# Patient Record
Sex: MALE | Race: WHITE | NOT HISPANIC OR LATINO | Employment: STUDENT | URBAN - METROPOLITAN AREA
[De-identification: names, ages, dates, MRNs, and addresses within clinical notes are randomized per-mention and may not be internally consistent; named-entity substitution may affect disease eponyms.]

---

## 2022-01-10 ENCOUNTER — MEDICAL CORRESPONDENCE (OUTPATIENT)
Dept: HEALTH INFORMATION MANAGEMENT | Facility: CLINIC | Age: 22
End: 2022-01-10
Payer: COMMERCIAL

## 2022-01-10 ENCOUNTER — TRANSFERRED RECORDS (OUTPATIENT)
Dept: HEALTH INFORMATION MANAGEMENT | Facility: CLINIC | Age: 22
End: 2022-01-10
Payer: COMMERCIAL

## 2022-01-10 ENCOUNTER — TRANSCRIBE ORDERS (OUTPATIENT)
Dept: OTHER | Age: 22
End: 2022-01-10
Payer: COMMERCIAL

## 2022-01-10 DIAGNOSIS — M95.8 OTHER SPECIFIED ACQUIRED DEFORMITIES OF MUSCULOSKELETAL SYSTEM: Primary | ICD-10-CM

## 2022-01-10 DIAGNOSIS — T84.84XA: ICD-10-CM

## 2022-01-10 DIAGNOSIS — Z98.890 OTHER SPECIFIED POSTPROCEDURAL STATES: ICD-10-CM

## 2022-01-10 DIAGNOSIS — M23.40 LOOSE BODY OF KNEE, UNSPECIFIED LATERALITY: ICD-10-CM

## 2022-01-11 ENCOUNTER — TELEPHONE (OUTPATIENT)
Dept: OTHER | Age: 22
End: 2022-01-11
Payer: COMMERCIAL

## 2022-01-11 NOTE — TELEPHONE ENCOUNTER
What provider at [facility] would see for the pt's dx? It shows non surgical, but looks like it could be a hardware removal.    Please triage and schedule/advise as appropriate    Ortho

## 2022-01-18 NOTE — TELEPHONE ENCOUNTER
DIAGNOSIS: Osteochondral defect of condyle of femur/XR/Natasha Guzman MD @ [facility] [place]   APPOINTMENT DATE: 1.19.22   NOTES STATUS DETAILS   OFFICE NOTE from referring provider Sent to scan 1/18 1:34 PM    OFFICE NOTE from other specialist N/A    DISCHARGE SUMMARY from hospital N/A    DISCHARGE REPORT from the ER N/A    OPERATIVE REPORT N/A    EMG report N/A    MEDICATION LIST N/A    MRI N/A 6.23.15 L knee, [facility]   DEXA (osteoporosis/bone health) N/A    CT SCAN N/A    XRAYS (IMAGES & REPORTS) PACS 1.10.22 L knee, [facility]    Other L knee images in PACS       Action 1.18.22 11:54 AM SARATH   Action Taken Faxed request to [facility] for all records [phone] and [phone]      Called [facility] and requested all L knee images bu pushed

## 2022-01-19 ENCOUNTER — OFFICE VISIT (OUTPATIENT)
Dept: ORTHOPEDICS | Facility: CLINIC | Age: 22
End: 2022-01-19
Payer: COMMERCIAL

## 2022-01-19 ENCOUNTER — PRE VISIT (OUTPATIENT)
Dept: ORTHOPEDICS | Facility: CLINIC | Age: 22
End: 2022-01-19

## 2022-01-19 ENCOUNTER — ANCILLARY PROCEDURE (OUTPATIENT)
Dept: GENERAL RADIOLOGY | Facility: CLINIC | Age: 22
End: 2022-01-19
Attending: ORTHOPAEDIC SURGERY
Payer: COMMERCIAL

## 2022-01-19 VITALS — WEIGHT: 220 LBS | BODY MASS INDEX: 29.8 KG/M2 | HEIGHT: 72 IN

## 2022-01-19 DIAGNOSIS — M95.8 OTHER SPECIFIED ACQUIRED DEFORMITIES OF MUSCULOSKELETAL SYSTEM: ICD-10-CM

## 2022-01-19 DIAGNOSIS — M23.40 LOOSE BODY OF KNEE, UNSPECIFIED LATERALITY: ICD-10-CM

## 2022-01-19 DIAGNOSIS — T84.84XA: ICD-10-CM

## 2022-01-19 DIAGNOSIS — Z98.890 OTHER SPECIFIED POSTPROCEDURAL STATES: ICD-10-CM

## 2022-01-19 PROCEDURE — 77073 BONE LENGTH STUDIES: CPT | Performed by: STUDENT IN AN ORGANIZED HEALTH CARE EDUCATION/TRAINING PROGRAM

## 2022-01-19 PROCEDURE — 99204 OFFICE O/P NEW MOD 45 MIN: CPT | Mod: GC | Performed by: ORTHOPAEDIC SURGERY

## 2022-01-19 ASSESSMENT — MIFFLIN-ST. JEOR: SCORE: 2040.91

## 2022-01-19 NOTE — LETTER
1/19/2022         RE: Fernando Joe  [address] [place] [id number]        Dear Colleague,    Thank you for referring your patient, Fernando Joe, to the [facility] [place]. Please see a copy of my visit note below.    CHIEF CONCERN: Left knee pain and mechanical symptoms, right knee mechanical symptoms    HISTORY:   Patient is a 21-year-old male who presents today for evaluation of his left and right knees.  He has a known history of bilateral lateral femoral condyle OCD lesions.  He underwent surgery in [facility] the [facility] in 2015 for his left knee and had what he describes was a cadaver allograft fixation procedure (no op note available today to review).  He reports that since then, he did quite well but started having some mechanical symptoms in his left knee 2 to 3 years ago.  These symptoms have been persistent since then occurring multiple times a week with range of motion activities and weightlifting.  He also has had some right knee mechanical symptoms that started about 2 years ago although are less frequent.  He denies any significant right knee pain.  His left knee does hurt him at approximately a 2/10 constant pain that he just deals with.  He is a student in chiropractic school and incidentally obtained knee x-rays which showed some ossific bodies in his knee which, in addition to his baseline symptoms over the past 2 to 3 years, prompted him to seek our evaluation today.    PAST MEDICAL HISTORY: (Reviewed with the patient and in the [facility] medical record)  1. None    PAST SURGICAL HISTORY: (Reviewed with the patient and in the [facility] medical record)  1. 2015-left knee OCD osteochondral allograft fixation    MEDICATIONS: (Reviewed with the patient and in the [facility] medical record)    Notable medications include: None    ALLERGIES: (Reviewed with the patient and in the [facility] medical record)  1. 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 Articulate and Interactive    Body mass index is 29.84 kg/m .    Right lower extremity :    Skin is Warm and Well perfused, no suggestion of infection    No appreciable effusion    Range of motion 0- 140    No tenderness to palpation over the medial or lateral patellofemoral joint lines or tibiofemoral joint lines.  No tenderness to firm palpation over the lateral femoral condyle    Stable to varus/valgus, Ia Lachman    EHL/FHL/TA/GS 5/5    Sensation intact L3-S1    2+ Dorsalis Pedis Pulse    Left lower extremity :    Skin is Warm and Well perfused, no suggestion of infection    Mild knee effusion    Range of motion 5- 135    No tenderness to palpation over the medial or lateral patellofemoral joint lines or the tibiofemoral joint lines.  No tenderness to palpation over the medial or lateral femoral condyles    Stable to varus and valgus, Ia Lachman    EHL/FHL/TA/GS 5/5    Sensation intact L3-S1    2+ Dorsalis Pedis Pulse    IMAGING:    Plain Radiographs: X-rays of the right and left knee obtained 1/10/2022 were reviewed and compared to prior x-rays from 2017 in 2015.  Left knee demonstrates a large lateral femoral condyle OCD lesion with fragment fixed with a K wire that has fractured at the interface between the allograft and the patient's native femur.  The K wire does not appear to be in the joint, though cannot definitively conclude from the views.  No other  fractures are seen.  There appears to be an ossific loose body in the suprapatellar pouch.    The right knee demonstrates a large lateral femoral condyle OCD lesion, no evidence of hardware or other fractures.  No significant degenerative joint disease in the medial compartment of the patellofemoral compartment.    MRI: No new MRI available for review.    ASSESSMENT:  1. Large left lateral femoral condyle OCD lesion, status post surgery in 2015 at outside institution  2. Large right lateral femoral condyle OCD lesion  3. Right and left knee mechanical symptoms (left more frequent than right)    PLAN:  1. Made a long discussion with the patient and reviewed the imaging that we have available from this past week and from prior years.  Given his diagnosis of bilateral OCD lesions and his clear mechanical symptoms, we discussed that we would be very likely to offer him surgery but would like to have an MRI of both knees to better evaluate the structures of the knee and the size of the OCD lesions which could help guide our surgical management.  We feel at this time we would likely opt for the patient arthroscopic surgery and loose body removal, and that the MRI and the intraoperative findings could help guide our further recommendations and coordination with the patient's symptoms moving forward.  2. Right and left knee MRIs  3. Standing long-leg alignment films  4. Follow-up in person after the MRI has been obtained and reviewed    The patient was seen and discussed with Dr. Nolan.    Nigel Hernández MD  Orthopedic Surgery PGY-5          Patient seen and examined with the Resident.     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  Osteochondritis dissecans lateral femoral condyle right knee    Plan standing alignment films, MRI right knee, MRI left knee, likely consider staged cartilage reconstruction with first arthroscopic chondroplasty  debridement loose body removal and subsequent osteochondral allograft transplantation if failed to see lasting improvement.  We will use the MRI to help determine.      I agree with history, physical and imaging as well as the assessment and plan as detailed by Dr. Hernández.         Again, thank you for allowing me to participate in the care of your patient.        Sincerely,        Esteban Nolan MD

## 2022-01-19 NOTE — PROGRESS NOTES
Patient seen and examined with the Resident.     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  Osteochondritis dissecans lateral femoral condyle right knee    Plan standing alignment films, MRI right knee, MRI left knee, likely consider staged cartilage reconstruction with first arthroscopic chondroplasty debridement loose body removal and subsequent osteochondral allograft transplantation if failed to see lasting improvement.  We will use the MRI to help determine.      I agree with history, physical and imaging as well as the assessment and plan as detailed by Dr. Hernández.

## 2022-01-19 NOTE — PROGRESS NOTES
CHIEF CONCERN: Left knee pain and mechanical symptoms, right knee mechanical symptoms    HISTORY:   Patient is a 21-year-old male who presents today for evaluation of his left and right knees.  He has a known history of bilateral lateral femoral condyle OCD lesions.  He underwent surgery in [facility] in 2015 for his left knee and had what he describes was a cadaver allograft fixation procedure (no op note available today to review).  He reports that since then, he did quite well but started having some mechanical symptoms in his left knee 2 to 3 years ago.  These symptoms have been persistent since then occurring multiple times a week with range of motion activities and weightlifting.  He also has had some right knee mechanical symptoms that started about 2 years ago although are less frequent.  He denies any significant right knee pain.  His left knee does hurt him at approximately a 2/10 constant pain that he just deals with.  He is a student in chiropractic school and incidentally obtained knee x-rays which showed some ossific bodies in his knee which, in addition to his baseline symptoms over the past 2 to 3 years, prompted him to seek our evaluation today.    PAST MEDICAL HISTORY: (Reviewed with the patient and in the [facility] medical record)  1. None    PAST SURGICAL HISTORY: (Reviewed with the patient and in the [facility] medical record)  1. 2015-left knee OCD osteochondral allograft fixation    MEDICATIONS: (Reviewed with the patient and in the [facility] medical record)    Notable medications include: None    ALLERGIES: (Reviewed with the patient and in the [facility] medical record)  1. 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 Articulate and Interactive    Body mass index is 29.84 kg/m .    Right lower extremity :    Skin is Warm and Well perfused, no suggestion of infection    No appreciable effusion    Range of motion 0- 140    No tenderness to palpation over the medial or lateral patellofemoral joint lines or tibiofemoral joint lines.  No tenderness to firm palpation over the lateral femoral condyle    Stable to varus/valgus, Ia Lachman    EHL/FHL/TA/GS 5/5    Sensation intact L3-S1    2+ Dorsalis Pedis Pulse    Left lower extremity :    Skin is Warm and Well perfused, no suggestion of infection    Mild knee effusion    Range of motion 5- 135    No tenderness to palpation over the medial or lateral patellofemoral joint lines or the tibiofemoral joint lines.  No tenderness to palpation over the medial or lateral femoral condyles    Stable to varus and valgus, Ia Lachman    EHL/FHL/TA/GS 5/5    Sensation intact L3-S1    2+ Dorsalis Pedis Pulse    IMAGING:    Plain Radiographs: X-rays of the right and left knee obtained 1/10/2022 were reviewed and compared to prior x-rays from 2017 in 2015.  Left knee demonstrates a large lateral femoral condyle OCD lesion with fragment fixed with a K wire that has fractured at the interface between the allograft and the patient's native femur.  The K wire does not appear to be in the joint, though cannot definitively conclude from the views.  No other fractures are seen.  There appears to be an ossific loose body in the suprapatellar pouch.    The right knee demonstrates a large lateral femoral condyle OCD lesion, no evidence of hardware or other fractures.  No significant degenerative joint disease in the  medial compartment of the patellofemoral compartment.    MRI: No new MRI available for review.    ASSESSMENT:  1. Large left lateral femoral condyle OCD lesion, status post surgery in 2015 at outside institution  2. Large right lateral femoral condyle OCD lesion  3. Right and left knee mechanical symptoms (left more frequent than right)    PLAN:  1. Made a long discussion with the patient and reviewed the imaging that we have available from this past week and from prior years.  Given his diagnosis of bilateral OCD lesions and his clear mechanical symptoms, we discussed that we would be very likely to offer him surgery but would like to have an MRI of both knees to better evaluate the structures of the knee and the size of the OCD lesions which could help guide our surgical management.  We feel at this time we would likely opt for the patient arthroscopic surgery and loose body removal, and that the MRI and the intraoperative findings could help guide our further recommendations and coordination with the patient's symptoms moving forward.  2. Right and left knee MRIs  3. Standing long-leg alignment films  4. Follow-up in person after the MRI has been obtained and reviewed    The patient was seen and discussed with Dr. Nolan.    Nigel Hernández MD  Orthopedic Surgery PGY-5

## 2022-01-19 NOTE — LETTER
Date:January 20, 2022      Patient was self referred, no letter generated. Do not send.        [facility] Health Information

## 2022-01-19 NOTE — NURSING NOTE
Reason For Visit:   Chief Complaint   Patient presents with     Consult     Left knee     ?  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  Work status?  Full time.  Date of injury: NA  Type of injury: NA.  Date of surgery: 2015/2016  Type of surgery: Unsure    He had a previous surgery in 2015/2016. He was doing well after surgery but recently has started to have some clicking and catching throughout his knee. He does have difficulty with squatting.     SANE Score  Left Knee: 85  Right Knee: 93    Pain Assessment  Patient Currently in Pain: Yes  0-10 Pain Scale: 2  Primary Pain Location: Knee    Ht 1.829 m (6')   Wt 99.8 kg (220 lb)   BMI 29.84 kg/m           Allergies   Allergen Reactions     Penicillins        No current outpatient medications on file.     No current facility-administered medications for this visit.         Susana Olguin, ATC

## 2022-01-28 ENCOUNTER — HOSPITAL ENCOUNTER (OUTPATIENT)
Dept: MRI IMAGING | Facility: CLINIC | Age: 22
End: 2022-01-28
Attending: ORTHOPAEDIC SURGERY
Payer: COMMERCIAL

## 2022-01-28 DIAGNOSIS — M23.40 LOOSE BODY OF KNEE, UNSPECIFIED LATERALITY: ICD-10-CM

## 2022-01-28 PROCEDURE — 73721 MRI JNT OF LWR EXTRE W/O DYE: CPT | Mod: 26 | Performed by: RADIOLOGY

## 2022-01-28 PROCEDURE — 73721 MRI JNT OF LWR EXTRE W/O DYE: CPT | Mod: 50

## 2022-01-31 ENCOUNTER — OFFICE VISIT (OUTPATIENT)
Dept: ORTHOPEDICS | Facility: CLINIC | Age: 22
End: 2022-01-31
Payer: COMMERCIAL

## 2022-01-31 ENCOUNTER — TELEPHONE (OUTPATIENT)
Dept: ORTHOPEDICS | Facility: CLINIC | Age: 22
End: 2022-01-31

## 2022-01-31 VITALS — HEIGHT: 72 IN | BODY MASS INDEX: 29.8 KG/M2 | WEIGHT: 220 LBS

## 2022-01-31 DIAGNOSIS — M93.20 OD (OSTEOCHONDRITIS DISSECANS): Primary | ICD-10-CM

## 2022-01-31 PROCEDURE — 99214 OFFICE O/P EST MOD 30 MIN: CPT | Performed by: ORTHOPAEDIC SURGERY

## 2022-01-31 ASSESSMENT — MIFFLIN-ST. JEOR: SCORE: 2040.91

## 2022-01-31 NOTE — LETTER
Date:February 1, 2022      Patient was self referred, no letter generated. Do not send.        [facility] Health Information

## 2022-01-31 NOTE — PROGRESS NOTES
I had an opportunity to do an in person visit today to discuss his MRI results.  In short he is a 21-year-old male he is status post left knee surgery for fixation of an unstable OCD lesion.  He notes continued locking catching of both knees.  His plain radiographs were consistent with an OCD lesion I asked to get MRIs.  He returns to clinic today to discuss the MRI results.    No changes in his examination from either knee since her last visit.  His motion profile is preserved.  No signs of infection.  Neurovascularly intact.    MRI of the right knee today was reviewed which does show a metal wire within the lateral femoral condyle of the right knee.  There is a OCD donor site in the lateral femoral condyle and a large loose body within the suprapatellar pouch.  The medial compartment appears largely normal.  And the patellofemoral compartment also appears normal.    MRI of the left knee is very similar which shows a large OCD donor site in the lateral femoral condyle with irregularity of the subchondral plate.  Large loose body in the suprapatellar pouch.  Again the medial compartment and patellofemoral compartments are well-preserved on my review.    Standing alignment films were obtained which shows overall neutral alignment.    Clinical assessment: Right knee OCD lesion lateral femoral condyle with large loose body in the suprapatellar pouch    Left knee OCD lesion lateral femoral condyle with large loose body in the suprapatellar pouch and prior K wire fixation    Plan: Long discussion today with the patient.  Reviewed the diagnosis potential treatment options.  I discussed with him both surgery as well as nonsurgery.  It is my recommendation to proceed with the following course of action: Examination under anesthesia right knee, right knee arthroscopy, chondroplasty, loose body removal and staging for future cartilage surgery.  I would also recommend doing examination under anesthesia left knee, left knee  arthroscopy, chondroplasty loose body removal and possible K wire removal.  I would be willing to do the surgeries at the same time and I think it would be reasonable to consider a bilateral procedure.    I discussed with him the pros cons risks and benefits of surgery.  He understands that he may not see lasting improvement.  He understands that he may have continued symptomatology.  I think at this time that her definitive treatment would be osteochondral allograft transplantation I do not think that he would need an osteotomy.  We will look for time to schedule this to be completed.

## 2022-01-31 NOTE — TELEPHONE ENCOUNTER
Patient is scheduled for surgery with Dr. Nolan    Spoke with: AARTI Meza - scheduled in clinic    Date of Surgery: 3/11/2022    Location: [facility]    Informed patient they will need an adult  yes    Pre op with Provider n/a    H&P: Scheduled with pcp    Pre-procedure COVID-19 Test: patient will scheduled with covid scheduling team    Additional imaging/appointments: n/a    Surgery packet: Given in clinic     Additional comments: n/a

## 2022-01-31 NOTE — LETTER
1/31/2022         RE: Fernando Joe  [address] [place] [id number]        Dear Colleague,    Thank you for referring your patient, Fernando Joe, to the [facility] [place]. Please see a copy of my visit note below.    I had an opportunity to do an in person visit today to discuss his MRI results.  In short he is a 21-year-old male he is status post left knee surgery for fixation of an unstable OCD lesion.  He notes continued locking catching of both knees.  His plain radiographs were consistent with an OCD lesion I asked to get MRIs.  He returns to clinic today to discuss the MRI results.    No changes in his examination from either knee since her last visit.  His motion profile is preserved.  No signs of infection.  Neurovascularly intact.    MRI of the right knee today was reviewed which does show a metal wire within the lateral femoral condyle of the right knee.  There is a OCD donor site in the lateral femoral condyle and a large loose body within the suprapatellar pouch.  The medial compartment appears largely normal.  And the patellofemoral compartment also appears normal.    MRI of the left knee is very similar which shows a large OCD donor site in the lateral femoral condyle with irregularity of the subchondral plate.  Large loose body in the suprapatellar pouch.  Again the medial compartment and patellofemoral compartments are well-preserved on my review.    Standing alignment films were obtained which shows overall neutral alignment.    Clinical assessment: Right knee OCD lesion lateral femoral condyle with large loose body in the suprapatellar pouch    Left knee OCD lesion lateral femoral condyle with large loose body in the suprapatellar pouch and prior K wire fixation    Plan: Long discussion today with the patient.  Reviewed the diagnosis potential treatment options.  I discussed with him both surgery as well as nonsurgery.  It is my recommendation to proceed with  the following course of action: Examination under anesthesia right knee, right knee arthroscopy, chondroplasty, loose body removal and staging for future cartilage surgery.  I would also recommend doing examination under anesthesia left knee, left knee arthroscopy, chondroplasty loose body removal and possible K wire removal.  I would be willing to do the surgeries at the same time and I think it would be reasonable to consider a bilateral procedure.    I discussed with him the pros cons risks and benefits of surgery.  He understands that he may not see lasting improvement.  He understands that he may have continued symptomatology.  I think at this time that her definitive treatment would be osteochondral allograft transplantation I do not think that he would need an osteotomy.  We will look for time to schedule this to be completed.      Again, thank you for allowing me to participate in the care of your patient.        Sincerely,        Esteban Nolan MD

## 2022-02-02 NOTE — NURSING NOTE
Teaching Flowsheet   Relevant Diagnosis: Bilateral arthroscopy, chondroplasty and loose body removal, left knee hardware removal  Teaching Topic: Pre operative care     Person(s) involved in teaching:   Patient     Motivation Level:  Asks Questions: Yes  Eager to Learn: Yes  Cooperative: Yes  Receptive (willing/able to accept information): Yes  Any cultural factors/Restorationist beliefs that may influence understanding or compliance? No       Patient demonstrates understanding of the following:  Reason for the appointment, diagnosis and treatment plan: Yes  Knowledge of proper use of medications and conditions for which they are ordered (with special attention to potential side effects or drug interactions): Yes  Which situations necessitate calling provider and whom to contact: Yes       Teaching Concerns Addressed: Pre operative care       Proper use and care of brace and crutches (medical equip, care aids, etc.): No, explain: will be provided at surgery  Nutritional needs and diet plan: Yes  Pain management techniques: Yes  Wound Care: Yes  How and/when to access community resources: Yes     Instructional Materials Used/Given: Pre operative instructions and surgical soap     Time spent with patient: 15 minutes.

## 2022-02-06 ENCOUNTER — HEALTH MAINTENANCE LETTER (OUTPATIENT)
Age: 22
End: 2022-02-06

## 2022-02-13 DIAGNOSIS — Z11.59 ENCOUNTER FOR SCREENING FOR OTHER VIRAL DISEASES: Primary | ICD-10-CM

## 2022-02-28 ENCOUNTER — OFFICE VISIT (OUTPATIENT)
Dept: INTERNAL MEDICINE | Facility: CLINIC | Age: 22
End: 2022-02-28
Payer: COMMERCIAL

## 2022-02-28 VITALS
DIASTOLIC BLOOD PRESSURE: 70 MMHG | TEMPERATURE: 97.7 F | HEART RATE: 65 BPM | WEIGHT: 224.8 LBS | OXYGEN SATURATION: 100 % | BODY MASS INDEX: 29.79 KG/M2 | HEIGHT: 73 IN | SYSTOLIC BLOOD PRESSURE: 122 MMHG

## 2022-02-28 DIAGNOSIS — Z01.818 ENCOUNTER FOR PREOPERATIVE ASSESSMENT: Primary | ICD-10-CM

## 2022-02-28 DIAGNOSIS — M93.20 OD (OSTEOCHONDRITIS DISSECANS): ICD-10-CM

## 2022-02-28 LAB
ANION GAP SERPL CALCULATED.3IONS-SCNC: 4 MMOL/L (ref 3–14)
BUN SERPL-MCNC: 16 MG/DL (ref 7–30)
CALCIUM SERPL-MCNC: 8.8 MG/DL (ref 8.5–10.1)
CHLORIDE BLD-SCNC: 107 MMOL/L (ref 94–109)
CO2 SERPL-SCNC: 28 MMOL/L (ref 20–32)
CREAT SERPL-MCNC: 0.92 MG/DL (ref 0.66–1.25)
ERYTHROCYTE [DISTWIDTH] IN BLOOD BY AUTOMATED COUNT: 12.9 % (ref 10–15)
GFR SERPL CREATININE-BSD FRML MDRD: >90 ML/MIN/1.73M2
GLUCOSE BLD-MCNC: 103 MG/DL (ref 70–99)
HCT VFR BLD AUTO: 47.2 % (ref 40–53)
HGB BLD-MCNC: 15.2 G/DL (ref 13.3–17.7)
MCH RBC QN AUTO: 29.9 PG (ref 26.5–33)
MCHC RBC AUTO-ENTMCNC: 32.2 G/DL (ref 31.5–36.5)
MCV RBC AUTO: 93 FL (ref 78–100)
PLATELET # BLD AUTO: 196 10E3/UL (ref 150–450)
POTASSIUM BLD-SCNC: 4.5 MMOL/L (ref 3.4–5.3)
RBC # BLD AUTO: 5.08 10E6/UL (ref 4.4–5.9)
SODIUM SERPL-SCNC: 139 MMOL/L (ref 133–144)
WBC # BLD AUTO: 4.8 10E3/UL (ref 4–11)

## 2022-02-28 PROCEDURE — 80048 BASIC METABOLIC PNL TOTAL CA: CPT | Performed by: INTERNAL MEDICINE

## 2022-02-28 PROCEDURE — 99203 OFFICE O/P NEW LOW 30 MIN: CPT | Performed by: INTERNAL MEDICINE

## 2022-02-28 PROCEDURE — 85027 COMPLETE CBC AUTOMATED: CPT | Performed by: INTERNAL MEDICINE

## 2022-02-28 PROCEDURE — 36415 COLL VENOUS BLD VENIPUNCTURE: CPT | Performed by: INTERNAL MEDICINE

## 2022-02-28 NOTE — PATIENT INSTRUCTIONS
- I will send you a message on [facility] when I am able to look at the results of your tests from today  - Do not take any fish oil supplements, vitamin E supplements, or NSAIDs like ibuprofen, aspirin, or Aleve for at least 5 days prior to surgery. Tylenol is okay to take.

## 2022-02-28 NOTE — PROGRESS NOTES
[address] [address]  Phone: [phone]  Primary Provider: No Ref-Primary, Physician  Pre-op Performing Provider: JASMINA PEREZ    PREOPERATIVE EVALUATION:  Today's date: 2/28/2022    Fernando Joe is a 22 year old male who presents for a preoperative evaluation.    Surgical Information:  Surgery/Procedure: bilateral knee examination under anesthesia, knee arthroscopy, chondroplasty and loose body removal  Surgery Location: [facility]  Surgeon: Dr. Nolan  Surgery Date: 3/11/22  Time of Surgery: 8:10AM  Where patient plans to recover: At home with family  Fax number for surgical facility: Note does not need to be faxed, will be available electronically in Epic.    Type of Anesthesia Anticipated: Choice    Assessment & Plan     The proposed surgical procedure is considered INTERMEDIATE risk.    Encounter for preoperative assessment  OD (osteochondritis dissecans)  Approval given to proceed with proposed procedure without further diagnostic evaluation. Able to exercise 5x per week without cardiac symptoms. No labs available for review - will check basic labs today. Discussed recommendation to discontinue NSAIDs 5 days prior to procedure to reduce bleeding risk. He takes no medications on a regular basis.  - CBC with platelets; Future  - Basic metabolic panel; Future    RECOMMENDATION:  APPROVAL GIVEN to proceed with proposed procedure, without further diagnostic evaluation.    Subjective   HPI related to upcoming procedure: Fernando presents for a pre-op exam. He has no other complaints. He reports no personal history of cardiac disease. He is able to walk up a flight of stairs without stopping, getting short of breath, or developing chest pain.    Preop Questions 2/28/2022   1. Have you ever had a heart attack or stroke? No   2. Have you ever had surgery on your heart or blood vessels, such as a stent  placement, a coronary artery bypass, or surgery on an artery in your head, neck, heart, or legs? No   3. Do you have chest pain with activity? No   4. Do you have a history of  heart failure? No   5. Do you currently have a cold, bronchitis or symptoms of other infection? No   6. Do you have a cough, shortness of breath, or wheezing? No   7. Do you or anyone in your family have previous history of blood clots? No   8. Do you or does anyone in your family have a serious bleeding problem such as prolonged bleeding following surgeries or cuts? No   9. Have you ever had problems with anemia or been told to take iron pills? No   10. Have you had any abnormal blood loss such as black, tarry or bloody stools? No   11. Have you ever had a blood transfusion? No   12. Are you willing to have a blood transfusion if it is medically needed before, during, or after your surgery? Yes   13. Have you or any of your relatives ever had problems with anesthesia? No   14. Do you have sleep apnea, excessive snoring or daytime drowsiness? No   15. Do you have any artifical heart valves or other implanted medical devices like a pacemaker, defibrillator, or continuous glucose monitor? No   16. Do you have artificial joints? No   17. Are you allergic to latex? No     Health Care Directive:  Patient does not have a Health Care Directive or Living Will: Discussed advance care planning with patient; however, patient declined at this time.    Preoperative Review of :   reviewed - no record of controlled substances prescribed.    Review of Systems  10pt ROS reviewed and all other systems negative unless otherwise stated above.    Patient Active Problem List    Diagnosis Date Noted     OD (osteochondritis dissecans) 01/31/2022     Priority: Medium     Added automatically from request for surgery [id number]        History reviewed. No pertinent past medical history.  History reviewed. No pertinent surgical history. - reports past knee cartilage  "surgery in 2016  No current outpatient medications on file.     Allergies   Allergen Reactions     Penicillins       Social History     Tobacco Use     Smoking status: Never Smoker     Smokeless tobacco: Never Used   Substance Use Topics     Alcohol use: Not on file     History   Drug Use Not on file         Objective   /70   Pulse 65   Temp 97.7  F (36.5  C) (Tympanic)   Ht 1.854 m (6' 1\")   Wt 102 kg (224 lb 12.8 oz)   SpO2 100%   BMI 29.66 kg/m      Physical Exam  GENERAL: alert and in no distress.  EYES: conjunctivae/corneas clear. EOMs grossly intact  HENT: Facies symmetric.  RESP: CTAB, no w/r/r.  CV: RRR, no m/r/g.  GI: NT, ND, without rebound tenderness or guarding  MSK: No edema. Moves all four extremities freely.  SKIN: No significant ulcers, lesions, or rashes on the visualized portions of the skin  NEURO: CN II-XII grossly intact.    No results for input(s): HGB, PLT, INR, NA, POTASSIUM, CR, A1C in the last [id number] hours.     Diagnostics:  Labs pending at this time.  Results will be reviewed when available.   No EKG required, no history of coronary heart disease, significant arrhythmia, peripheral arterial disease or other structural heart disease.    Revised Cardiac Risk Index (RCRI):  The patient has the following serious cardiovascular risks for perioperative complications:   - No serious cardiac risks = 0 points     RCRI Interpretation: 0 points: Class I (very low risk - 0.4% complication rate)    Signed Electronically by: Shaka Martinez MD  Copy of this evaluation report is provided to requesting physician.  "

## 2022-03-07 ENCOUNTER — LAB (OUTPATIENT)
Dept: URGENT CARE | Facility: URGENT CARE | Age: 22
End: 2022-03-07
Attending: FAMILY MEDICINE
Payer: COMMERCIAL

## 2022-03-07 DIAGNOSIS — Z11.59 ENCOUNTER FOR SCREENING FOR OTHER VIRAL DISEASES: ICD-10-CM

## 2022-03-07 LAB — SARS-COV-2 RNA RESP QL NAA+PROBE: NEGATIVE

## 2022-03-07 PROCEDURE — U0003 INFECTIOUS AGENT DETECTION BY NUCLEIC ACID (DNA OR RNA); SEVERE ACUTE RESPIRATORY SYNDROME CORONAVIRUS 2 (SARS-COV-2) (CORONAVIRUS DISEASE [COVID-19]), AMPLIFIED PROBE TECHNIQUE, MAKING USE OF HIGH THROUGHPUT TECHNOLOGIES AS DESCRIBED BY CMS-2020-01-R: HCPCS

## 2022-03-07 PROCEDURE — U0005 INFEC AGEN DETEC AMPLI PROBE: HCPCS

## 2022-03-10 ENCOUNTER — ANESTHESIA EVENT (OUTPATIENT)
Dept: SURGERY | Facility: AMBULATORY SURGERY CENTER | Age: 22
End: 2022-03-10
Payer: COMMERCIAL

## 2022-03-11 ENCOUNTER — HOSPITAL ENCOUNTER (OUTPATIENT)
Facility: AMBULATORY SURGERY CENTER | Age: 22
Discharge: HOME | End: 2022-03-11
Attending: ORTHOPAEDIC SURGERY
Payer: COMMERCIAL

## 2022-03-11 ENCOUNTER — ANESTHESIA (OUTPATIENT)
Dept: SURGERY | Facility: AMBULATORY SURGERY CENTER | Age: 22
End: 2022-03-11
Payer: COMMERCIAL

## 2022-03-11 VITALS
WEIGHT: 224 LBS | DIASTOLIC BLOOD PRESSURE: 57 MMHG | OXYGEN SATURATION: 98 % | HEIGHT: 73 IN | TEMPERATURE: 97.4 F | SYSTOLIC BLOOD PRESSURE: 112 MMHG | BODY MASS INDEX: 29.69 KG/M2 | RESPIRATION RATE: 16 BRPM | HEART RATE: 83 BPM

## 2022-03-11 DIAGNOSIS — M93.20 OD (OSTEOCHONDRITIS DISSECANS): ICD-10-CM

## 2022-03-11 PROCEDURE — 29874 ARTHRS KNEE SURG RMV LOOS/FB: CPT | Mod: 59 | Performed by: ORTHOPAEDIC SURGERY

## 2022-03-11 PROCEDURE — 29877 ARTHRS KNEE SURG DBRDMT/SHVG: CPT | Mod: RT

## 2022-03-11 PROCEDURE — 29877 ARTHRS KNEE SURG DBRDMT/SHVG: CPT | Mod: RT | Performed by: ORTHOPAEDIC SURGERY

## 2022-03-11 PROCEDURE — 29874 ARTHRS KNEE SURG RMV LOOS/FB: CPT | Mod: 59,LT

## 2022-03-11 RX ORDER — FENTANYL CITRATE 50 UG/ML
25 INJECTION, SOLUTION INTRAMUSCULAR; INTRAVENOUS
Status: DISCONTINUED | OUTPATIENT
Start: 2022-03-11 | End: 2022-03-12 | Stop reason: HOSPADM

## 2022-03-11 RX ORDER — ONDANSETRON 4 MG/1
4-8 TABLET, ORALLY DISINTEGRATING ORAL EVERY 8 HOURS PRN
Qty: 4 TABLET | Refills: 0 | Status: SHIPPED | OUTPATIENT
Start: 2022-03-11

## 2022-03-11 RX ORDER — DEXAMETHASONE SODIUM PHOSPHATE 4 MG/ML
INJECTION, SOLUTION INTRA-ARTICULAR; INTRALESIONAL; INTRAMUSCULAR; INTRAVENOUS; SOFT TISSUE PRN
Status: DISCONTINUED | OUTPATIENT
Start: 2022-03-11 | End: 2022-03-11

## 2022-03-11 RX ORDER — ONDANSETRON 2 MG/ML
4 INJECTION INTRAMUSCULAR; INTRAVENOUS EVERY 30 MIN PRN
Status: DISCONTINUED | OUTPATIENT
Start: 2022-03-11 | End: 2022-03-12 | Stop reason: HOSPADM

## 2022-03-11 RX ORDER — ONDANSETRON 2 MG/ML
INJECTION INTRAMUSCULAR; INTRAVENOUS PRN
Status: DISCONTINUED | OUTPATIENT
Start: 2022-03-11 | End: 2022-03-11

## 2022-03-11 RX ORDER — FENTANYL CITRATE 50 UG/ML
INJECTION, SOLUTION INTRAMUSCULAR; INTRAVENOUS PRN
Status: DISCONTINUED | OUTPATIENT
Start: 2022-03-11 | End: 2022-03-11

## 2022-03-11 RX ORDER — PROPOFOL 10 MG/ML
INJECTION, EMULSION INTRAVENOUS PRN
Status: DISCONTINUED | OUTPATIENT
Start: 2022-03-11 | End: 2022-03-11

## 2022-03-11 RX ORDER — BUPIVACAINE HYDROCHLORIDE AND EPINEPHRINE 2.5; 5 MG/ML; UG/ML
INJECTION, SOLUTION INFILTRATION; PERINEURAL PRN
Status: DISCONTINUED | OUTPATIENT
Start: 2022-03-11 | End: 2022-03-11 | Stop reason: HOSPADM

## 2022-03-11 RX ORDER — PROPOFOL 10 MG/ML
INJECTION, EMULSION INTRAVENOUS CONTINUOUS PRN
Status: DISCONTINUED | OUTPATIENT
Start: 2022-03-11 | End: 2022-03-11

## 2022-03-11 RX ORDER — KETOROLAC TROMETHAMINE 30 MG/ML
INJECTION, SOLUTION INTRAMUSCULAR; INTRAVENOUS PRN
Status: DISCONTINUED | OUTPATIENT
Start: 2022-03-11 | End: 2022-03-11

## 2022-03-11 RX ORDER — HYDROMORPHONE HYDROCHLORIDE 1 MG/ML
0.2 INJECTION, SOLUTION INTRAMUSCULAR; INTRAVENOUS; SUBCUTANEOUS EVERY 5 MIN PRN
Status: DISCONTINUED | OUTPATIENT
Start: 2022-03-11 | End: 2022-03-11 | Stop reason: HOSPADM

## 2022-03-11 RX ORDER — GLYCOPYRROLATE 0.2 MG/ML
INJECTION, SOLUTION INTRAMUSCULAR; INTRAVENOUS PRN
Status: DISCONTINUED | OUTPATIENT
Start: 2022-03-11 | End: 2022-03-11

## 2022-03-11 RX ORDER — ONDANSETRON 4 MG/1
4 TABLET, ORALLY DISINTEGRATING ORAL EVERY 30 MIN PRN
Status: DISCONTINUED | OUTPATIENT
Start: 2022-03-11 | End: 2022-03-12 | Stop reason: HOSPADM

## 2022-03-11 RX ORDER — OXYCODONE HYDROCHLORIDE 5 MG/1
5-10 TABLET ORAL EVERY 4 HOURS PRN
Qty: 20 TABLET | Refills: 0 | Status: SHIPPED | OUTPATIENT
Start: 2022-03-11

## 2022-03-11 RX ORDER — ONDANSETRON 4 MG/1
4 TABLET, ORALLY DISINTEGRATING ORAL
Status: DISCONTINUED | OUTPATIENT
Start: 2022-03-11 | End: 2022-03-12 | Stop reason: HOSPADM

## 2022-03-11 RX ORDER — ACETAMINOPHEN 325 MG/1
975 TABLET ORAL ONCE
Status: COMPLETED | OUTPATIENT
Start: 2022-03-11 | End: 2022-03-11

## 2022-03-11 RX ORDER — ACETAMINOPHEN 325 MG/1
650 TABLET ORAL EVERY 4 HOURS PRN
Qty: 50 TABLET | Refills: 0 | Status: SHIPPED | OUTPATIENT
Start: 2022-03-11

## 2022-03-11 RX ORDER — SODIUM CHLORIDE, SODIUM LACTATE, POTASSIUM CHLORIDE, CALCIUM CHLORIDE 600; 310; 30; 20 MG/100ML; MG/100ML; MG/100ML; MG/100ML
INJECTION, SOLUTION INTRAVENOUS CONTINUOUS
Status: DISCONTINUED | OUTPATIENT
Start: 2022-03-11 | End: 2022-03-11 | Stop reason: HOSPADM

## 2022-03-11 RX ORDER — OXYCODONE HYDROCHLORIDE 5 MG/1
5 TABLET ORAL
Status: COMPLETED | OUTPATIENT
Start: 2022-03-11 | End: 2022-03-11

## 2022-03-11 RX ORDER — OXYCODONE HYDROCHLORIDE 5 MG/1
5 TABLET ORAL EVERY 4 HOURS PRN
Status: DISCONTINUED | OUTPATIENT
Start: 2022-03-11 | End: 2022-03-12 | Stop reason: HOSPADM

## 2022-03-11 RX ORDER — CEFAZOLIN SODIUM 2 G/50ML
2 SOLUTION INTRAVENOUS SEE ADMIN INSTRUCTIONS
Status: DISCONTINUED | OUTPATIENT
Start: 2022-03-11 | End: 2022-03-11 | Stop reason: HOSPADM

## 2022-03-11 RX ORDER — CEFAZOLIN SODIUM 2 G/50ML
2 SOLUTION INTRAVENOUS
Status: COMPLETED | OUTPATIENT
Start: 2022-03-11 | End: 2022-03-11

## 2022-03-11 RX ORDER — KETAMINE HYDROCHLORIDE 10 MG/ML
INJECTION, SOLUTION INTRAMUSCULAR; INTRAVENOUS PRN
Status: DISCONTINUED | OUTPATIENT
Start: 2022-03-11 | End: 2022-03-11

## 2022-03-11 RX ORDER — IBUPROFEN 600 MG/1
600 TABLET, FILM COATED ORAL EVERY 6 HOURS PRN
Qty: 30 TABLET | Refills: 0 | Status: SHIPPED | OUTPATIENT
Start: 2022-03-11

## 2022-03-11 RX ORDER — SODIUM CHLORIDE, SODIUM LACTATE, POTASSIUM CHLORIDE, CALCIUM CHLORIDE 600; 310; 30; 20 MG/100ML; MG/100ML; MG/100ML; MG/100ML
INJECTION, SOLUTION INTRAVENOUS CONTINUOUS
Status: DISCONTINUED | OUTPATIENT
Start: 2022-03-11 | End: 2022-03-12 | Stop reason: HOSPADM

## 2022-03-11 RX ORDER — ACETAMINOPHEN 325 MG/1
975 TABLET ORAL ONCE
Status: DISCONTINUED | OUTPATIENT
Start: 2022-03-11 | End: 2022-03-11 | Stop reason: HOSPADM

## 2022-03-11 RX ORDER — AMOXICILLIN 250 MG
1-2 CAPSULE ORAL 2 TIMES DAILY
Qty: 30 TABLET | Refills: 0 | Status: SHIPPED | OUTPATIENT
Start: 2022-03-11

## 2022-03-11 RX ORDER — FENTANYL CITRATE 50 UG/ML
25 INJECTION, SOLUTION INTRAMUSCULAR; INTRAVENOUS EVERY 5 MIN PRN
Status: DISCONTINUED | OUTPATIENT
Start: 2022-03-11 | End: 2022-03-11 | Stop reason: HOSPADM

## 2022-03-11 RX ORDER — MEPERIDINE HYDROCHLORIDE 25 MG/ML
12.5 INJECTION INTRAMUSCULAR; INTRAVENOUS; SUBCUTANEOUS
Status: DISCONTINUED | OUTPATIENT
Start: 2022-03-11 | End: 2022-03-12 | Stop reason: HOSPADM

## 2022-03-11 RX ORDER — LIDOCAINE 40 MG/G
CREAM TOPICAL
Status: DISCONTINUED | OUTPATIENT
Start: 2022-03-11 | End: 2022-03-11 | Stop reason: HOSPADM

## 2022-03-11 RX ORDER — ACETAMINOPHEN 325 MG/1
650 TABLET ORAL
Status: DISCONTINUED | OUTPATIENT
Start: 2022-03-11 | End: 2022-03-12 | Stop reason: HOSPADM

## 2022-03-11 RX ORDER — HYDROXYZINE HYDROCHLORIDE 25 MG/1
25 TABLET, FILM COATED ORAL
Status: DISCONTINUED | OUTPATIENT
Start: 2022-03-11 | End: 2022-03-12 | Stop reason: HOSPADM

## 2022-03-11 RX ORDER — LIDOCAINE HYDROCHLORIDE 20 MG/ML
INJECTION, SOLUTION INFILTRATION; PERINEURAL PRN
Status: DISCONTINUED | OUTPATIENT
Start: 2022-03-11 | End: 2022-03-11

## 2022-03-11 RX ADMIN — ONDANSETRON 4 MG: 2 INJECTION INTRAMUSCULAR; INTRAVENOUS at 07:58

## 2022-03-11 RX ADMIN — FENTANYL CITRATE 25 MCG: 50 INJECTION, SOLUTION INTRAMUSCULAR; INTRAVENOUS at 08:33

## 2022-03-11 RX ADMIN — KETOROLAC TROMETHAMINE 30 MG: 30 INJECTION, SOLUTION INTRAMUSCULAR; INTRAVENOUS at 09:12

## 2022-03-11 RX ADMIN — FENTANYL CITRATE 50 MCG: 50 INJECTION, SOLUTION INTRAMUSCULAR; INTRAVENOUS at 08:04

## 2022-03-11 RX ADMIN — Medication 0.5 MG: at 09:31

## 2022-03-11 RX ADMIN — OXYCODONE HYDROCHLORIDE 5 MG: 5 TABLET ORAL at 10:19

## 2022-03-11 RX ADMIN — PROPOFOL 200 MG: 10 INJECTION, EMULSION INTRAVENOUS at 08:02

## 2022-03-11 RX ADMIN — KETAMINE HYDROCHLORIDE 30 MG: 10 INJECTION, SOLUTION INTRAMUSCULAR; INTRAVENOUS at 08:25

## 2022-03-11 RX ADMIN — ACETAMINOPHEN 975 MG: 325 TABLET ORAL at 07:10

## 2022-03-11 RX ADMIN — DEXAMETHASONE SODIUM PHOSPHATE 4 MG: 4 INJECTION, SOLUTION INTRA-ARTICULAR; INTRALESIONAL; INTRAMUSCULAR; INTRAVENOUS; SOFT TISSUE at 07:58

## 2022-03-11 RX ADMIN — LIDOCAINE HYDROCHLORIDE 100 MG: 20 INJECTION, SOLUTION INFILTRATION; PERINEURAL at 08:02

## 2022-03-11 RX ADMIN — PROPOFOL 200 MCG/KG/MIN: 10 INJECTION, EMULSION INTRAVENOUS at 08:02

## 2022-03-11 RX ADMIN — SODIUM CHLORIDE, SODIUM LACTATE, POTASSIUM CHLORIDE, CALCIUM CHLORIDE: 600; 310; 30; 20 INJECTION, SOLUTION INTRAVENOUS at 07:54

## 2022-03-11 RX ADMIN — CEFAZOLIN SODIUM 2 G: 2 SOLUTION INTRAVENOUS at 08:10

## 2022-03-11 RX ADMIN — GLYCOPYRROLATE 0.2 MG: 0.2 INJECTION, SOLUTION INTRAMUSCULAR; INTRAVENOUS at 08:29

## 2022-03-11 RX ADMIN — FENTANYL CITRATE 25 MCG: 50 INJECTION, SOLUTION INTRAMUSCULAR; INTRAVENOUS at 08:34

## 2022-03-11 RX ADMIN — GLYCOPYRROLATE 0.2 MG: 0.2 INJECTION, SOLUTION INTRAMUSCULAR; INTRAVENOUS at 07:58

## 2022-03-11 NOTE — DISCHARGE INSTRUCTIONS
"    Safety Tips for Using Crutches    Crutch Fit:    Assume good standing posture with shoulders relaxed and crutch tips 6-8 inches out from the side of the foot.    The underarm pad should fall 2-3 fingers width below the armpit.    The handgrip is positioned level with the wrist to allow 30  flexion at the elbow.    Safety Tips:    Bear weight on your hands, not on your armpits.    Do not add extra padding to the underarm pad. This will, in effect, lengthen the crutches and increase risk of nerve injury.    Wear flat, properly fitting shoes. Do not walk in stocking feet, high heels or slippers.    Household hazards:  --Throw rugs should be removed from floors.  --Stairs should be cleared of obstacles.  --Use extra caution on slippery, highly polished, littered or uneven floor surfaces.  --Check for electric cords.    Check crutch tips for excessive wear and keep wing nuts tight.    While walking, look forward with  head up  and  eyes open.  Take equal length steps.    Use BOTH crutches.    Stairs Sequence:    UP: \"Good\" leg first, followed by  bad  leg, then crutches.    DOWN: Crutches, followed by  bad  leg, \"good\" leg.     Walking with Crutches:    Move both crutches forward at the same time.    Non-Weight Bearing (NWB):  Hold the involved leg up and swing through the crutches with the involved leg. The involved leg does not touch the floor.    Toe Touch Weight Bearing (TTWB): Move the involved leg forward. Rest it lightly on the floor for balance only. Step through the crutches with the uninvolved leg.    Partial Weight Bearing (PWB): Move the involved leg forward. Step down the weight of the leg only.  Step through the crutches with the uninvolved leg.  Weight Bearing As Tolerated (WBAT): Move the involved leg forward. Put as much pressure through the involved leg as you can tolerate comfortably. Then step through the crutches with the uninvolved leg.[facility]  Home Care " "Following Anesthesia  For 24 hours after surgery:  Get plenty of rest.  A responsible adult must stay with you for at least 24 hours after you leave the surgery center.  Do not drive or use heavy equipment.  If you have weakness or tingling, don't drive or use heavy equipment until this feeling goes away.   Do not drink alcohol.   Avoid strenuous or risky activities.  Ask for help when climbing stairs.  You may feel lightheaded.  IF so, sit for a few minutes before standing.  Have someone help you get up.   If you have nausea (feel sick to your stomach): Drink only clear liquids such as apple juice, ginger ale, broth or 7-Up.  Rest may also help.  Be sure to drink enough fluids.  Move to a regular diet as you feel able.   You may have a slight fever.  Call the doctor if your fever is over 100 F (37.7 C) (taken under the tongue) or lasts longer than 24 hours.  You may have a dry mouth, a sore throat, muscle aches or trouble sleeping. These should go away after 24 hours.  Do not make important or legal decisions.   It is recommended to avoid smoking.        Today you received a Marcaine or bupivacaine block to numb the nerves near your surgery site.  This is a block using local anesthetic or \"numbing\" medication injected around the nerves to anesthetize or \"numb\" the area supplied by those nerves.  This block is injected into the muscle layer near your surgical site.  The medication may numb the location where you had surgery for 6-18 hours, but may last up to 24 hours.  If your surgical site is an arm or leg you should be careful with your affected limb, since it is possible to injure your limb without being aware of it due to the numbing.  Until full feeling returns, you should guard against bumping or hitting your limb, and avoid extreme hot or cold temperatures on the skin.  As the block wears off, the feeling will return as a tingling or prickly sensation near your surgical site.  You will experience more " discomfort from your incision as the feeling returns.  You may want to take a pain pill (a narcotic or Tylenol if this was prescribed by your surgeon) when you start to experience mild pain before the pain beccomes more severe.  If your pain medications do not control your pain you should notifiy your surgeon.    Tips for taking pain medications  To get the best pain relief possible, remember these points:  Take pain medications as directed, before pain becomes severe.  Pain medication can upset your stomach: taking it with food may help.  Constipation is a common side effect of pain medication. Drink plenty of  fluids.  Eat foods high in fiber. Take a stool softener if recommended by your doctor or pharmacist.  Do not drink alcohol, drive or operate machinery while taking pain medications.  Ask about other ways to control pain, such as with heat, ice or relaxation.    Tylenol/Acetaminophen Consumption  To help encourage the safe use of acetaminophen, the makers of TYLENOL  have lowered the maximum daily dose for single-ingredient Extra Strength TYLENOL  (acetaminophen) products sold in the [place] from 8 pills per day (4,000 mg) to 6 pills per day (3,000 mg). The dosing interval has also changed from 2 pills every 4-6 hours to 2 pills every 6 hours.  If you feel your pain relief is insufficient, you may take Tylenol/Acetaminophen in addition to your narcotic pain medication.   Be careful not to exceed 3,000 mg of Tylenol/Acetaminophen in a 24 hour period from all sources.  If you are taking extra strength Tylenol/acetaminophen (500 mg), the maximum dose is 6 tablets in 24 hours.  If you are taking regular strength acetaminophen (325 mg), the maximum dose is 9 tablets in 24 hours.    Call a doctor for any of the following:  Signs of infection (fever, growing tenderness at the surgery site, a large amount of drainage or bleeding, severe pain, foul-smelling drainage, redness, swelling).  It has been over 8 to 10 hours  since surgery and you are still not able to urinate (pass water).  Headache for over 24 hours.  Numbness, tingling or weakness the day after surgery (if you had spinal anesthesia).  Signs of Covid-19 infection (temperature over 100 degrees, shortness of breath, cough, loss of taste/smell, generalized body aches, persistent headache, chills, sore throat, nausea/vomiting/diarrhea)  Your doctor is:       Dr. Esteban Nolan, Orthopaedics: [phone]               Or dial [phone] and ask for the resident on call for:  Orthopaedics  For emergency care, call the:  [facility]: [phone] (TTY for hearing impaired: [phone])

## 2022-03-11 NOTE — OP NOTE
PREOPERATIVE DIAGNOSIS:   1. Right knee osteochondritis dissecans lateral femoral condyle  2. Right knee loose body  3. Left knee osteochondritis dissecans status post surgical reconstruction with K wire fixation  4. Left knee loose body    POSTOPERATIVE DIAGNOSIS:  1. 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 Right knee loose body 25 x 20 mm  3. Left knee osteochondritis dissecans status post surgical reconstruction and K wire fixation  4. Left knee loose body 25 x 18 mm    PROCEDURE:  1. Examination under anesthesia right knee  1. Right knee arthroscopy  2. Chondroplasty lateral femoral condyle  3. Arthroscopic loose body removal requiring enlargement of the medial portal  2. Examination under anesthesia left knee  1. Left knee arthroscopy  2. Chondroplasty lateral femoral condyle  3. Arthroscopic loose body removal requiring enlargement of the medial portal  4. Arthroscopic hardware removal    DATE OF SURGERY: 3/11/2022    SURGEON: Esteban Nolan MD    ASSISTANT: Delia Ross PA-C. The assistance of Ms. Ross was necessary for positioning, arthroscopic visualization, retraction, arthroscopic loose body removal of both knees and hardware removal.     RESIDENT OR FELLOW: Deyanira Brothers MD    OPERATIVE INDICATIONS: Fernando Joe is a pleasant 22 year old who I saw through my orthopedic clinic with a history, physical, imaging consistent with right knee pain, locking catching.  Imaging consistent with osteochondritis dissecans.  Large displaced fragment within the suprapatellar pouch.  Clearly unreconstructable.  MRI of the left knee also showed a large loose body in the suprapatellar pouch.  Prior K wire fixation was noted in the lateral femoral condyle.  I discussed the patient my thoughts regarding cartilage reconstruction he understood that the first step of this was to perform arthroscopic loose body removal of both knees and try to take out this  K wire as well.  He understood that he would be a candidate for osteochondral allograft transplantation in the future..  I reviewed with the patient the risks, benefits, complications, techniques and alternatives to surgery.  We reviewed the expected course of recovery and the potential expected outcomes.  The patient understood both the risks and benefits and desired to proceed despite the risks.    OPERATIVE DETAILS: In the preoperative area the patient's informed consent was reviewed and they desired to proceed.  The right and left leg was marked and the patient was in agreement.  The patient was taken to the operating room where a timeout was performed and all parties were in agreement.  Preoperative antibiotics were given within 1 hour of the time of incision.  The patient was placed in the supine position and surrendered to LMA anesthesia.  No tourniquet was applied.  Egg crate was placed beneath the well leg and a side post was utilized.  The operative leg was prepped and draped in the usual sterile fashion.     Examination Under Anesthesia of the right knee demonstrated range of motion 0 to 135 degrees.  Stable to varus valgus stress testing.  Stable anterior and posterior drawer testing.  No pivot shift.  Lachman 0.    Anterior medial and anterior lateral arthroscopic portal.  And a diagnostic arthroscopy was performed with the following findings: Large loose body in the suprapatellar pouch, no loose bodies in the medial gutter or lateral gutter.  Medial femoral condyle, medial tibial plateau, medial meniscus normal.  ACL PCL normal.  Lateral femoral condyle showed grade 4 chondral defect of at least 2 x 2 cm.  Lateral tibial plateau, lateral meniscus normal.  At this time the medial portal was enlarged.  And this large loose body was taken out without complication.  A shaver was then introduced and a chondroplasty of the lateral femoral condyle was completed to until a balanced and stable  rim of cartilage  remained.  At this time we performed a closure of the right knee and turned our attention to the left knee.    Examination under anesthesia of the left knee demonstrated range of motion 0 to 135 degrees.  Stable to varus and valgus stress testing.  Stable anterior and posterior drawer testing.  No pivot shift.  Lachman 0.    Anterior medial and anterior lateral arthroscopic portals were created and a diagnostic arthroscopy was performed the following findings: Large loose body in the suprapatellar pouch, no loose bodies in the medial gutter or lateral gutter.  Medial femoral condyle medial tibial plateau medial meniscus normal.  ACL PCL normal.  Medial patella facet central ridge lateral patella facet central trochlea was normal with just some grade 1 softening.  Grade 4 chondral defect lateral femoral condyle at least 2 x 2 cm .  Lateral tibial plateau was normal and lateral meniscus was normal.    At this time the medial portal was enlarged a grasper was introduced and the fragment was taken out without complication.  A shaver was introduced and a chondroplasty lateral femoral condyle was completed until a balanced stable rim of cartilage remained.  A grasper was then introduced as well as a pituitary rongeur and this K wire which the tip was visible was taken out without complication.    Copious irrigation was performed an a layered closure was initiated, sterile dressings were applied and the patient was transferred to the recovery room in stable condition with stable vital signs.    ESTIMATED BLOOD LOSS: 5 mL on the right leg, 5 mL on the left leg.    TOURNIQUET TIME: No tourniquet was placed to either knee    COMPLICATIONS: None apparent.    DRAINS: None.    SPECIMENS: None.     POSTOPERATIVE PLAN:  1. Weightbearing as tolerated bilateral lower extremities  2. Range of motion as tolerated bilateral lower extremities  3. No brace necessary  4. No running jumping pounding sports for 6 weeks  5. No sweating while  the sutures are still in place.  Once the sutures were removed he can return to nonpounding methods of exercise  6. If he does well going forward no further intervention is necessary.  If he fails to see lasting improvement from either knee he would be a candidate for osteochondral allograft transplantation to the lateral femoral condyle both knees.

## 2022-03-11 NOTE — ANESTHESIA POSTPROCEDURE EVALUATION
Patient: Fernando Joe    Procedure: Procedure(s):  bilateral knee examination under anesthesia, knee arthroscopy, chondroplasty and loose body removal  REMOVAL, HARDWARE, KNEE LEFT       Anesthesia Type:  General    Note:  Disposition: Outpatient   Postop Pain Control: Uneventful            Sign Out: Well controlled pain   PONV: No   Neuro/Psych: Uneventful            Sign Out: Acceptable/Baseline neuro status   Airway/Respiratory: Uneventful            Sign Out: Acceptable/Baseline resp. status   CV/Hemodynamics: Uneventful            Sign Out: Acceptable CV status   Other NRE: NONE   DID A NON-ROUTINE EVENT OCCUR? No           Last vitals:  Vitals Value Taken Time   /54 03/11/22 1000   Temp 36.2  C (97.1  F) 03/11/22 1000   Pulse 82 03/11/22 1004   Resp 5 03/11/22 1004   SpO2 96 % 03/11/22 1004   Vitals shown include unvalidated device data.    Electronically Signed By: Anurag Dewitt MD  March 11, 2022  4:09 PM

## 2022-03-11 NOTE — ANESTHESIA PREPROCEDURE EVALUATION
Anesthesia Pre-Procedure Evaluation    Patient: Fernando Joe   MRN: [id number] : 2000        Procedure : Procedure(s):  bilateral knee examination under anesthesia, knee arthroscopy, chondroplasty and loose body removal  REMOVAL, HARDWARE, KNEE LEFT          No past medical history on file.   History reviewed. No pertinent surgical history.   Allergies   Allergen Reactions     Penicillins       Social History     Tobacco Use     Smoking status: Never Smoker     Smokeless tobacco: Never Used   Substance Use Topics     Alcohol use: Not on file      Wt Readings from Last 1 Encounters:   22 101.6 kg (224 lb)        Anesthesia Evaluation   Pt has had prior anesthetic. Type: General.    No history of anesthetic complications       ROS/MED HX  ENT/Pulmonary:  - neg pulmonary ROS     Neurologic:  - neg neurologic ROS     Cardiovascular:    (-) wheezes   METS/Exercise Tolerance: >4 METS    Hematologic:  - neg hematologic  ROS     Musculoskeletal: Comment: OD - neg musculoskeletal ROS     GI/Hepatic:  - neg GI/hepatic ROS     Renal/Genitourinary:  - neg Renal ROS     Endo:  - neg endo ROS     Psychiatric/Substance Use:  - neg psychiatric ROS     Infectious Disease:  - neg infectious disease ROS     Malignancy:  - neg malignancy ROS     Other:  - neg other ROS          Physical Exam    Airway        Mallampati: I   TM distance: > 3 FB   Neck ROM: full   Mouth opening: > 3 cm    Respiratory Devices and Support         Dental  no notable dental history         Cardiovascular          Rhythm and rate: regular and normal     Pulmonary           breath sounds clear to auscultation   (-) no decreased breath sounds and no wheezes        OUTSIDE LABS:  CBC:   Lab Results   Component Value Date    WBC 4.8 2022    HGB 15.2 2022    HCT 47.2 2022     2022     BMP:   Lab Results   Component Value Date     2022    POTASSIUM 4.5 2022    CHLORIDE 107 2022    CO2 28  02/28/2022    BUN 16 02/28/2022    CR 0.92 02/28/2022     (H) 02/28/2022     COAGS: No results found for: PTT, INR, FIBR  POC: No results found for: BGM, HCG, HCGS  HEPATIC: No results found for: ALBUMIN, PROTTOTAL, ALT, AST, GGT, ALKPHOS, BILITOTAL, BILIDIRECT, CHRISTAL  OTHER:   Lab Results   Component Value Date    KLAUS 8.8 02/28/2022       Anesthesia Plan    ASA Status:  1   NPO Status:  NPO Appropriate    Anesthesia Type: General.     - Airway: LMA      Maintenance: TIVA.        Consents    Anesthesia Plan(s) and associated risks, benefits, and realistic alternatives discussed. Questions answered and patient/representative(s) expressed understanding.    - Discussed:     - Discussed with:  Patient      - Extended Intubation/Ventilatory Support Discussed: No.      - Patient is DNR/DNI Status: No    Use of blood products discussed: No .     Postoperative Care    Pain management: IV analgesics.   PONV prophylaxis: Dexamethasone or Solumedrol, Background Propofol Infusion, Ondansetron (or other 5HT-3)     Comments:                Randy Garcia

## 2022-03-11 NOTE — ANESTHESIA CARE TRANSFER NOTE
Patient: Fernando Joe    Procedure: Procedure(s):  bilateral knee examination under anesthesia, knee arthroscopy, chondroplasty and loose body removal  REMOVAL, HARDWARE, KNEE LEFT       Diagnosis: OD (osteochondritis dissecans) [M93.20]  Diagnosis Additional Information: No value filed.    Anesthesia Type:   General     Note:    Oropharynx: oropharynx clear of all foreign objects and spontaneously breathing  Level of Consciousness: drowsy  Oxygen Supplementation: face mask  Level of Supplemental Oxygen (L/min / FiO2): 6  Independent Airway: airway patency satisfactory and stable  Dentition: dentition unchanged  Vital Signs Stable: post-procedure vital signs reviewed and stable  Report to RN Given: handoff report given  Patient transferred to: PACU    Handoff Report: Identifed the Patient, Identified the Reponsible Provider, Reviewed the pertinent medical history, Discussed the surgical course, Reviewed Intra-OP anesthesia mangement and issues during anesthesia, Set expectations for post-procedure period and Allowed opportunity for questions and acknowledgement of understanding      Vitals:  Vitals Value Taken Time   BP     Temp     Pulse     Resp     SpO2         Electronically Signed By: ACOSTA Garcia CRNA  March 11, 2022  9:32 AM

## 2022-03-14 ENCOUNTER — THERAPY VISIT (OUTPATIENT)
Dept: PHYSICAL THERAPY | Facility: CLINIC | Age: 22
End: 2022-03-14
Attending: ORTHOPAEDIC SURGERY
Payer: COMMERCIAL

## 2022-03-14 DIAGNOSIS — M93.20 OD (OSTEOCHONDRITIS DISSECANS): ICD-10-CM

## 2022-03-14 DIAGNOSIS — M25.562 ACUTE PAIN OF BOTH KNEES: ICD-10-CM

## 2022-03-14 DIAGNOSIS — R26.9 ABNORMAL GAIT: ICD-10-CM

## 2022-03-14 DIAGNOSIS — M25.561 ACUTE PAIN OF BOTH KNEES: ICD-10-CM

## 2022-03-14 DIAGNOSIS — Z47.89 UNSPECIFIED ORTHOPEDIC AFTERCARE: ICD-10-CM

## 2022-03-14 PROCEDURE — 97110 THERAPEUTIC EXERCISES: CPT | Mod: GP | Performed by: PHYSICAL THERAPIST

## 2022-03-14 PROCEDURE — 97161 PT EVAL LOW COMPLEX 20 MIN: CPT | Mod: GP | Performed by: PHYSICAL THERAPIST

## 2022-03-14 ASSESSMENT — ACTIVITIES OF DAILY LIVING (ADL)
LIMPING: THE SYMPTOM AFFECTS MY ACTIVITY MODERATELY
KNEEL ON THE FRONT OF YOUR KNEE: ACTIVITY IS VERY DIFFICULT
WEAKNESS: THE SYMPTOM AFFECTS MY ACTIVITY SEVERELY
SWELLING: THE SYMPTOM AFFECTS MY ACTIVITY SLIGHTLY
GIVING WAY, BUCKLING OR SHIFTING OF KNEE: THE SYMPTOM AFFECTS MY ACTIVITY MODERATELY
GO UP STAIRS: ACTIVITY IS MINIMALLY DIFFICULT
HOW_WOULD_YOU_RATE_THE_OVERALL_FUNCTION_OF_YOUR_KNEE_DURING_YOUR_USUAL_DAILY_ACTIVITIES?: NORMAL
HOW_WOULD_YOU_RATE_THE_CURRENT_FUNCTION_OF_YOUR_KNEE_DURING_YOUR_USUAL_DAILY_ACTIVITIES_ON_A_SCALE_FROM_0_TO_100_WITH_100_BEING_YOUR_LEVEL_OF_KNEE_FUNCTION_PRIOR_TO_YOUR_INJURY_AND_0_BEING_THE_INABILITY_TO_PERFORM_ANY_OF_YOUR_USUAL_DAILY_ACTIVITIES?: 65
STIFFNESS: I HAVE THE SYMPTOM BUT IT DOES NOT AFFECT MY ACTIVITY
SIT WITH YOUR KNEE BENT: ACTIVITY IS MINIMALLY DIFFICULT
RISE FROM A CHAIR: ACTIVITY IS SOMEWHAT DIFFICULT
WALK: ACTIVITY IS MINIMALLY DIFFICULT
STAND: ACTIVITY IS FAIRLY DIFFICULT
KNEE_ACTIVITY_OF_DAILY_LIVING_SCORE: 51.43
AS_A_RESULT_OF_YOUR_KNEE_INJURY,_HOW_WOULD_YOU_RATE_YOUR_CURRENT_LEVEL_OF_DAILY_ACTIVITY?: ABNORMAL
SQUAT: ACTIVITY IS FAIRLY DIFFICULT
KNEE_ACTIVITY_OF_DAILY_LIVING_SUM: 36
GO DOWN STAIRS: ACTIVITY IS FAIRLY DIFFICULT
RAW_SCORE: 36
PAIN: THE SYMPTOM AFFECTS MY ACTIVITY MODERATELY

## 2022-03-14 NOTE — PROGRESS NOTES
Physical Therapy Initial Evaluation  Subjective:  The history is provided by the patient. No  was used.   Patient Health History  Fernando Joe being seen for both knees.     Problem began: 3/11/2022.   Problem occurred: overuse   Pain is reported as 3/10 on pain scale.  General health as reported by patient is good.  Pertinent medical history includes: none.   Red flags:  None as reported by patient.   Other medical allergies details: penicillin.   Surgeries include:  Orthopedic surgery. Other surgery history details: knee.    Current medications:  Pain medication and anti-inflammatory.    Current occupation is student.   Primary job tasks include:  Computer work and prolonged sitting.                  Therapist Generated HPI Evaluation  Problem details: Patient is s/p bilateral knee chondroplasty and loose body removal from 3-11-22.         Type of problem:  Bilateral knees.    This is a new condition.  Condition occurred with:  Repetition/overuse.  Where condition occurred: during recreation/sport.  Patient reports pain:  Anterior.  Pain is described as aching and is intermittent.  Pain is worse during the day.  Since onset symptoms are gradually improving.  Symptoms are exacerbated by walking, ascending stairs, descending stairs, bending/squatting and kneeling  and relieved by rest and NSAID's.      Barriers include:  None as reported by patient.                        Objective:    Gait:    Assistive Devices:  None  Deviations:  Knee:  Knee extension decr R                                                      Knee Evaluation:  ROM:    AROM      Extension:  Left: 11    Right:  19    PROM      Extension: Left: 11    Right:  13  Flexion: Left: 109    Right:  101      Strength:     Extension:  Left: 5-/5   Pain:      Right: 5-/5   Pain:  Flexion:  Left: 5-/5   Pain:      Right: 5-/5   Pain:    Quad Set Left: Fair    Pain:   Quad Set Right: Fair    Pain:  Ligament Testing:  Not  Assessed                Special Tests: Not Assessed      Palpation:  Palpation of knee: quad atrophy noted R>L.      Edema:  Edema of the knee: moderate edema noted B.            General     ROS    Assessment/Plan:    Patient is a 22 year old male with both sides knee complaints.    Patient has the following significant findings with corresponding treatment plan.                Diagnosis 1:  B knee s/p chondroplasty and loose body removal  Pain -  hot/cold therapy, self management, education and home program  Decreased ROM/flexibility - manual therapy and therapeutic exercise  Decreased strength - therapeutic exercise and therapeutic activities  Edema - cold therapy and self management/home program  Impaired gait - gait training  Decreased function - therapeutic activities    Therapy Evaluation Codes:   Cumulative Therapy Evaluation is: Low complexity.    Previous and current functional limitations:  (See Goal Flow Sheet for this information)    Short term and Long term goals: (See Goal Flow Sheet for this information)     Communication ability:  Patient appears to be able to clearly communicate and understand verbal and written communication and follow directions correctly.  Treatment Explanation - The following has been discussed with the patient:   RX ordered/plan of care  Anticipated outcomes  Possible risks and side effects  This patient would benefit from PT intervention to resume normal activities.   Rehab potential is good.    Frequency:  1 X week, once daily  Duration:  for 8 weeks  Discharge Plan:  Achieve all LTG.  Independent in home treatment program.  Reach maximal therapeutic benefit.    Please refer to the daily flowsheet for treatment today, total treatment time and time spent performing 1:1 timed codes.

## 2022-03-21 ENCOUNTER — OFFICE VISIT (OUTPATIENT)
Dept: ORTHOPEDICS | Facility: CLINIC | Age: 22
End: 2022-03-21
Payer: COMMERCIAL

## 2022-03-21 ENCOUNTER — THERAPY VISIT (OUTPATIENT)
Dept: PHYSICAL THERAPY | Facility: CLINIC | Age: 22
End: 2022-03-21
Payer: COMMERCIAL

## 2022-03-21 VITALS — HEIGHT: 73 IN | BODY MASS INDEX: 29.69 KG/M2 | WEIGHT: 224 LBS

## 2022-03-21 DIAGNOSIS — Z47.89 UNSPECIFIED ORTHOPEDIC AFTERCARE: ICD-10-CM

## 2022-03-21 DIAGNOSIS — M25.561 CHRONIC PAIN OF RIGHT KNEE: ICD-10-CM

## 2022-03-21 DIAGNOSIS — M25.562 ACUTE PAIN OF BOTH KNEES: ICD-10-CM

## 2022-03-21 DIAGNOSIS — R26.9 ABNORMAL GAIT: ICD-10-CM

## 2022-03-21 DIAGNOSIS — M25.562 CHRONIC PAIN OF LEFT KNEE: Primary | ICD-10-CM

## 2022-03-21 DIAGNOSIS — M25.561 ACUTE PAIN OF BOTH KNEES: ICD-10-CM

## 2022-03-21 DIAGNOSIS — G89.29 CHRONIC PAIN OF LEFT KNEE: Primary | ICD-10-CM

## 2022-03-21 DIAGNOSIS — G89.29 CHRONIC PAIN OF RIGHT KNEE: ICD-10-CM

## 2022-03-21 PROCEDURE — 99024 POSTOP FOLLOW-UP VISIT: CPT | Performed by: ORTHOPAEDIC SURGERY

## 2022-03-21 PROCEDURE — 97110 THERAPEUTIC EXERCISES: CPT | Mod: GP | Performed by: PHYSICAL THERAPIST

## 2022-03-21 NOTE — LETTER
Date:March 22, 2022      Patient was self referred, no letter generated. Do not send.        [facility] Health Information

## 2022-03-21 NOTE — LETTER
3/21/2022         RE: Fernando Joe  [address] [place] [id number]        Dear Colleague,    Thank you for referring your patient, Fernando Joe, to the [facility] [place]. Please see a copy of my visit note below.    Orthopedic Clinic Note    Subjective: This is a 22-year-old male who is following up status post bilateral knee lateral femoral condyle OCD chondroplasty and arthroscopic loose body removal with hard removal on the left side.  The patient is doing well today.  He is taking ibuprofen for pain.  He has been ranging his knees and weightbearing as tolerated.  No concerns today.    Examination: The patient is in no acute distress.  He has nonlabored breathing on room air.  There is mild swelling of bilateral knees.  Steri-Strips are in place.  He is able to come to full extension and has 120 degrees of flexion bilaterally.  Sensation intact to light touch diffusely of bilateral lower extremities.  Extremities warm and well-perfused.    Assessment: 22-year-old male status post bilateral knee arthroscopy, chondroplasty of the lateral femoral condyles, arthroscopic loose body removal, and hard removal of the left knee 3/11/2022 with Dr. Nolan.  Doing well.    Plan:   The patient will continue weightbearing and ranging as tolerated.  He will hold off of rigorous activities for 6 weeks.  We will see him back in clinic 6 weeks postoperatively for clinical recheck.    The patient was seen and discussed with Dr. Nolan who agrees the above assessment and plan.    Lashanda Uriarte MD  Orthopedic Surgery, PGY5    Patient seen and examined with the resident.     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     Operative report provided and arthroscopic images reviewed    Follow up at 6 weeks from the date of surgery with no new X-Rays needed    The patient will follow-up with me at 6 weeks at that time we will continue our discussion regarding cartilage reconstruction if he is having symptoms.  This treatment would be osteochondral allograft transplantation.    At this time I do not think that he should need an osteotomy as I think his alignment is neutral.    I agree with history, physical and imaging as well as the assessment and plan as detailed by Dr. Uriarte.         Again, thank you for allowing me to participate in the care of your patient.        Sincerely,        Esteban Nolan MD     Esther Barcenas

## 2022-03-21 NOTE — PROGRESS NOTES
Orthopedic Clinic Note    Subjective: This is a 22-year-old male who is following up status post bilateral knee lateral femoral condyle OCD chondroplasty and arthroscopic loose body removal with hard removal on the left side.  The patient is doing well today.  He is taking ibuprofen for pain.  He has been ranging his knees and weightbearing as tolerated.  No concerns today.    Examination: The patient is in no acute distress.  He has nonlabored breathing on room air.  There is mild swelling of bilateral knees.  Steri-Strips are in place.  He is able to come to full extension and has 120 degrees of flexion bilaterally.  Sensation intact to light touch diffusely of bilateral lower extremities.  Extremities warm and well-perfused.    Assessment: 22-year-old male status post bilateral knee arthroscopy, chondroplasty of the lateral femoral condyles, arthroscopic loose body removal, and hard removal of the left knee 3/11/2022 with Dr. Nolan.  Doing well.    Plan:   The patient will continue weightbearing and ranging as tolerated.  He will hold off of rigorous activities for 6 weeks.  We will see him back in clinic 6 weeks postoperatively for clinical recheck.    The patient was seen and discussed with Dr. Nolan who agrees the above assessment and plan.    Lashanda Uriarte MD  Orthopedic Surgery, PGY5

## 2022-03-21 NOTE — PROGRESS NOTES
Patient seen and examined with the resident.     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     Operative report provided and arthroscopic images reviewed    Follow up at 6 weeks from the date of surgery with no new X-Rays needed    The patient will follow-up with me at 6 weeks at that time we will continue our discussion regarding cartilage reconstruction if he is having symptoms.  This treatment would be osteochondral allograft transplantation.    At this time I do not think that he should need an osteotomy as I think his alignment is neutral.    I agree with history, physical and imaging as well as the assessment and plan as detailed by Dr. Uriarte.

## 2022-04-04 ENCOUNTER — THERAPY VISIT (OUTPATIENT)
Dept: PHYSICAL THERAPY | Facility: CLINIC | Age: 22
End: 2022-04-04
Payer: COMMERCIAL

## 2022-04-04 DIAGNOSIS — Z47.89 UNSPECIFIED ORTHOPEDIC AFTERCARE: ICD-10-CM

## 2022-04-04 DIAGNOSIS — R26.9 ABNORMAL GAIT: ICD-10-CM

## 2022-04-04 DIAGNOSIS — M25.561 ACUTE PAIN OF BOTH KNEES: ICD-10-CM

## 2022-04-04 DIAGNOSIS — M25.562 ACUTE PAIN OF BOTH KNEES: ICD-10-CM

## 2022-04-04 PROCEDURE — 97110 THERAPEUTIC EXERCISES: CPT | Mod: GP | Performed by: PHYSICAL THERAPIST

## 2022-04-04 PROCEDURE — 97112 NEUROMUSCULAR REEDUCATION: CPT | Mod: GP | Performed by: PHYSICAL THERAPIST

## 2022-04-11 ENCOUNTER — THERAPY VISIT (OUTPATIENT)
Dept: PHYSICAL THERAPY | Facility: CLINIC | Age: 22
End: 2022-04-11
Payer: COMMERCIAL

## 2022-04-11 DIAGNOSIS — M25.562 ACUTE PAIN OF BOTH KNEES: Primary | ICD-10-CM

## 2022-04-11 DIAGNOSIS — M25.561 ACUTE PAIN OF BOTH KNEES: Primary | ICD-10-CM

## 2022-04-11 DIAGNOSIS — Z47.89 UNSPECIFIED ORTHOPEDIC AFTERCARE: ICD-10-CM

## 2022-04-11 DIAGNOSIS — R26.9 ABNORMAL GAIT: ICD-10-CM

## 2022-04-11 PROCEDURE — 97112 NEUROMUSCULAR REEDUCATION: CPT | Mod: GP | Performed by: PHYSICAL THERAPIST

## 2022-04-11 PROCEDURE — 97110 THERAPEUTIC EXERCISES: CPT | Mod: GP | Performed by: PHYSICAL THERAPIST

## 2022-05-02 ENCOUNTER — OFFICE VISIT (OUTPATIENT)
Dept: ORTHOPEDICS | Facility: CLINIC | Age: 22
End: 2022-05-02

## 2022-05-02 VITALS — WEIGHT: 224 LBS | HEIGHT: 73 IN | BODY MASS INDEX: 29.69 KG/M2

## 2022-05-02 DIAGNOSIS — G89.29 CHRONIC PAIN OF LEFT KNEE: Primary | ICD-10-CM

## 2022-05-02 DIAGNOSIS — M25.562 CHRONIC PAIN OF LEFT KNEE: Primary | ICD-10-CM

## 2022-05-02 DIAGNOSIS — G89.29 CHRONIC PAIN OF RIGHT KNEE: ICD-10-CM

## 2022-05-02 DIAGNOSIS — M25.561 CHRONIC PAIN OF RIGHT KNEE: ICD-10-CM

## 2022-05-02 PROCEDURE — 99024 POSTOP FOLLOW-UP VISIT: CPT | Performed by: ORTHOPAEDIC SURGERY

## 2022-05-02 NOTE — NURSING NOTE
"Reason For Visit:   Chief Complaint   Patient presents with     RECHECK     DOS: 3/11/22 Bilateral knee arthroscopy, chondroplasty and loose body removal, hardware removal in left knee     Date of surgery: 3/11/22  Type of surgery:   PROCEDURE:  1. Examination under anesthesia right knee  1. Right knee arthroscopy  2. Chondroplasty lateral femoral condyle  3. Arthroscopic loose body removal requiring enlargement of the medial portal  2. Examination under anesthesia left knee  1. Left knee arthroscopy  2. Chondroplasty lateral femoral condyle  3. Arthroscopic loose body removal requiring enlargement of the medial portal  4. Arthroscopic hardware removal    SANE Score  Left Knee: 95  Right Knee: 85    Pain Assessment  Patient Currently in Pain: Yes  0-10 Pain Scale: 1  Primary Pain Location: Knee    Ht 1.854 m (6' 1\")   Wt 101.6 kg (224 lb)   BMI 29.55 kg/m           Allergies   Allergen Reactions     Penicillins        Current Outpatient Medications   Medication     acetaminophen (TYLENOL) 325 MG tablet     ibuprofen (ADVIL/MOTRIN) 600 MG tablet     ondansetron (ZOFRAN-ODT) 4 MG ODT tab     oxyCODONE (ROXICODONE) 5 MG tablet     senna-docusate (SENOKOT-S/PERICOLACE) 8.6-50 MG tablet     No current facility-administered medications for this visit.         Susana Olguin, ATC    "

## 2022-05-02 NOTE — PROGRESS NOTES
DIAGNOSIS:   1. 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 Right knee loose body 25 x 20 mm  3. Left knee osteochondritis dissecans status post surgical reconstruction and K wire fixation  4. Left knee loose body 25 x 18 mm    PROCEDURES:  1.  Examination under anesthesia right knee  1. Right knee arthroscopy  2. Chondroplasty lateral femoral condyle  3. Arthroscopic loose body removal requiring enlargement of the medial portal  2.  Examination under anesthesia left knee  1. Left knee arthroscopy  2. Chondroplasty lateral femoral condyle  3. Arthroscopic loose body removal requiring enlargement of the medial portal  4. Arthroscopic hardware removal      HISTORY:  Fernando returns today now 6 weeks out from the above procedures.  Overall he reports he is doing well.  Pain is controlled.  Range of motion is going great.  He has continued to work with physical therapy.    EXAM:     General: Awake, Alert, and oriented. Articulates and communicates with a normal affect     Bilateral lower Extremity:    Incisions well healed without evidence of infection    No significant effusion today    Range of motion from 0 to 135 degrees bilaterally    Neurovascularly intact    IMAGING:  No new imaging obtained today.    ASSESSMENT:  Fernando is now 6 weeks out from the above procedures.  He is overall doing very well.  At this point now he may ramp up his activities as tolerated.  We did have a discussion today regarding his osteochondral defects.  He would be a candidate for osteochondral allograft in the future should he continue to have pain with his activities.  We did have a discussion regarding this today so that he is aware that this possibility exists.    PLAN:   1.  Return to activities as tolerated  2.  Follow-up as needed

## 2022-05-02 NOTE — LETTER
5/2/2022         RE: Fernando Joe  [address] [place] [id number]        Dear Colleague,    Thank you for referring your patient, Fernando Joe, to the [facility] [place]. Please see a copy of my visit note below.    DIAGNOSIS:   1. 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 Right knee loose body 25 x 20 mm  3. Left knee osteochondritis dissecans status post surgical reconstruction and K wire fixation  4. Left knee loose body 25 x 18 mm    PROCEDURES:  1.  Examination under anesthesia right knee  1. Right knee arthroscopy  2. Chondroplasty lateral femoral condyle  3. Arthroscopic loose body removal requiring enlargement of the medial portal  2.  Examination under anesthesia left knee  1. Left knee arthroscopy  2. Chondroplasty lateral femoral condyle  3. Arthroscopic loose body removal requiring enlargement of the medial portal  4. Arthroscopic hardware removal      HISTORY:  Fernando returns today now 6 weeks out from the above procedures.  Overall he reports he is doing well.  Pain is controlled.  Range of motion is going great.  He has continued to work with physical therapy.    EXAM:     General: Awake, Alert, and oriented. Articulates and communicates with a normal affect     Bilateral lower Extremity:    Incisions well healed without evidence of infection    No significant effusion today    Range of motion from 0 to 135 degrees bilaterally    Neurovascularly intact    IMAGING:  No new imaging obtained today.    ASSESSMENT:  Fernando is now 6 weeks out from the above procedures.  He is overall doing very well.  At this point now he may ramp up his activities as tolerated.  We did have a discussion today regarding his osteochondral defects.  He would be a candidate for osteochondral allograft in the future should he continue to have pain with his activities.  We did have a discussion regarding this today so that he is aware  that this possibility exists.    PLAN:   1.  Return to activities as tolerated  2.  Follow-up as needed      Patient seen and examined with the fellow.     Assesment: 6 weeks following bilateral arthroscopic chondroplasty and loose body removal.  Doing well.    Plan: Weightbearing as tolerated  Range of motion as tolerated  Transition back to desired activities  No specific restrictions  Follow-up with me as needed.  If he does well it may be reasonable to touch base again at the 1 year anniversary of surgery just for interval recheck  I told the patient that I do not believe in prophylactic or preventative cartilage reconstruction and that if he does well then no further intervention is required.  If he should have ongoing pain and/or swelling he should return to my clinic to discuss osteochondral allograft transplantation.  He has a standing offer to proceed in both knees without surgery.    I agree with history, physical and imaging as well as the assessment and plan as detailed by Dr. Abdi.         Again, thank you for allowing me to participate in the care of your patient.        Sincerely,        Esteban Nolan MD

## 2022-05-02 NOTE — PROGRESS NOTES
Patient seen and examined with the fellow.     Assesment: 6 weeks following bilateral arthroscopic chondroplasty and loose body removal.  Doing well.    Plan: Weightbearing as tolerated  Range of motion as tolerated  Transition back to desired activities  No specific restrictions  Follow-up with me as needed.  If he does well it may be reasonable to touch base again at the 1 year anniversary of surgery just for interval recheck  I told the patient that I do not believe in prophylactic or preventative cartilage reconstruction and that if he does well then no further intervention is required.  If he should have ongoing pain and/or swelling he should return to my clinic to discuss osteochondral allograft transplantation.  He has a standing offer to proceed in both knees without surgery.    I agree with history, physical and imaging as well as the assessment and plan as detailed by Dr. Abdi.

## 2022-06-13 PROBLEM — M25.562 ACUTE PAIN OF BOTH KNEES: Status: RESOLVED | Noted: 2022-03-14 | Resolved: 2022-06-13

## 2022-06-13 PROBLEM — M25.561 ACUTE PAIN OF BOTH KNEES: Status: RESOLVED | Noted: 2022-03-14 | Resolved: 2022-06-13

## 2022-06-13 PROBLEM — Z47.89 UNSPECIFIED ORTHOPEDIC AFTERCARE: Status: RESOLVED | Noted: 2022-03-14 | Resolved: 2022-06-13

## 2022-06-13 PROBLEM — R26.9 ABNORMAL GAIT: Status: RESOLVED | Noted: 2022-03-14 | Resolved: 2022-06-13

## 2022-06-13 NOTE — PROGRESS NOTES
Discharge Note    Progress reporting period is from initial evaluation date (please see noted date below) to Apr 11, 2022.  Linked Episodes   Type: Episode: Status: Noted: Resolved: Last update: Updated by:   PHYSICAL THERAPY s/p B knee arthroscopy 3-14-22 Active 3/14/2022  4/11/2022  1:09 PM Alina Tom, PT      Comments:       Fernando failed to follow up and current status is unknown.  Please see information below for last relevant information on current status.  Patient seen for 4 visits.    SUBJECTIVE  Subjective changes noted by patient:  Has reciprocal gait up/down stairs. Walking goes well. Feeling stronger. Very little pain.  .  Current pain level is 0/10.     Previous pain level was  3/10.   Changes in function:  Yes (See Goal flowsheet attached for changes in current functional level)  Adverse reaction to treatment or activity: None    OBJECTIVE  Changes noted in objective findings:   PROM L knee 0-5-133, PROM R knee 0-3-132.   Good quad set B. Independent SLR without extensor lag B.     ASSESSMENT/PLAN  Diagnosis: s/p B chondroplasty/loose body removal   Updated problem list and treatment plan:   No updated problem list or treatment plan as patient did not return to PT and they are discharged at this time.    STG/LTGs have been met or progress has been made towards goals:  Yes, please see goal flowsheet for most current information  Assessment of Progress: current status is unknown.    Last current status: Pt is progressing well, Pt is becoming more independent in their HEP   Self Management Plans:  HEP  I have re-evaluated this patient and find that the nature, scope, duration and intensity of the therapy is appropriate for the medical condition of the patient.  Fernando continues to require the following intervention to meet STG and LTG's:  HEP.    Recommendations:  Discharge with current home program.  Patient to follow up with MD as needed.    Please refer to the daily flowsheet for treatment today,  total treatment time and time spent performing 1:1 timed codes.

## 2022-10-03 ENCOUNTER — HEALTH MAINTENANCE LETTER (OUTPATIENT)
Age: 22
End: 2022-10-03

## 2023-02-12 ENCOUNTER — HEALTH MAINTENANCE LETTER (OUTPATIENT)
Age: 23
End: 2023-02-12

## 2024-03-09 ENCOUNTER — HEALTH MAINTENANCE LETTER (OUTPATIENT)
Age: 24
End: 2024-03-09

## 2025-03-16 ENCOUNTER — HEALTH MAINTENANCE LETTER (OUTPATIENT)
Age: 25
End: 2025-03-16

## (undated) DEVICE — BUR ARTHREX COOLCUT SABRE 4.0MMX13CM AR-8400SR

## (undated) DEVICE — LINEN TOWEL PACK X5 5464

## (undated) DEVICE — LINEN ORTHO PACK 5446

## (undated) DEVICE — PREP DURAPREP 26ML APL 8630

## (undated) DEVICE — Device

## (undated) DEVICE — GLOVE PROTEXIS POWDER FREE SMT 8.0  2D72PT80X

## (undated) DEVICE — DRSG GAUZE 4X4" 3033

## (undated) DEVICE — LINEN DRAPE 54X72" 5467

## (undated) DEVICE — ESU PENCIL SMOKE EVAC W/ROCKER SWITCH 0703-047-000

## (undated) DEVICE — DRAPE STOCKINETTE IMPERVIOUS 12" 1587

## (undated) DEVICE — SOL NACL 0.9% IRRIG 500ML BOTTLE 2F7123

## (undated) DEVICE — PACK LOWER EXTREMITY CUSTOM ASC

## (undated) DEVICE — DRSG ABDOMINAL 07 1/2X8" 7197D

## (undated) DEVICE — CAST PADDING 6" STERILE 9046S

## (undated) DEVICE — GOWN IMPERVIOUS SPECIALTY XLG/XLONG 32474

## (undated) DEVICE — DRSG ADAPTIC 3X8" 6113

## (undated) DEVICE — CAST PADDING 4" STERILE 9044S

## (undated) DEVICE — SUCTION MANIFOLD NEPTUNE 2 SYS 1 PORT 702-025-000

## (undated) DEVICE — GLOVE PROTEXIS BLUE W/NEU-THERA 8.0  2D73EB80

## (undated) DEVICE — SOL NACL 0.9% IRRIG 3000ML BAG 2B7477

## (undated) DEVICE — DRAPE EXTREMITY BILAT

## (undated) DEVICE — SUCTION MANIFOLD NEPTUNE 2 SYS 4 PORT 0702-020-000

## (undated) DEVICE — PACK ARTHROSCOPY CUSTOM ASC

## (undated) RX ORDER — PROPOFOL 10 MG/ML
INJECTION, EMULSION INTRAVENOUS
Status: DISPENSED
Start: 2022-03-11

## (undated) RX ORDER — GLYCOPYRROLATE 0.2 MG/ML
INJECTION INTRAMUSCULAR; INTRAVENOUS
Status: DISPENSED
Start: 2022-03-11

## (undated) RX ORDER — CEFAZOLIN SODIUM 1 G/3ML
INJECTION, POWDER, FOR SOLUTION INTRAMUSCULAR; INTRAVENOUS
Status: DISPENSED
Start: 2022-03-11

## (undated) RX ORDER — ACETAMINOPHEN 325 MG/1
TABLET ORAL
Status: DISPENSED
Start: 2022-03-11

## (undated) RX ORDER — BUPIVACAINE HYDROCHLORIDE 2.5 MG/ML
INJECTION, SOLUTION EPIDURAL; INFILTRATION; INTRACAUDAL
Status: DISPENSED
Start: 2022-03-11

## (undated) RX ORDER — EPINEPHRINE 1 MG/ML
INJECTION, SOLUTION INTRAMUSCULAR; SUBCUTANEOUS
Status: DISPENSED
Start: 2022-03-11

## (undated) RX ORDER — DEXAMETHASONE SODIUM PHOSPHATE 4 MG/ML
INJECTION, SOLUTION INTRA-ARTICULAR; INTRALESIONAL; INTRAMUSCULAR; INTRAVENOUS; SOFT TISSUE
Status: DISPENSED
Start: 2022-03-11

## (undated) RX ORDER — FENTANYL CITRATE 50 UG/ML
INJECTION, SOLUTION INTRAMUSCULAR; INTRAVENOUS
Status: DISPENSED
Start: 2022-03-11

## (undated) RX ORDER — HYDROMORPHONE HYDROCHLORIDE 1 MG/ML
INJECTION, SOLUTION INTRAMUSCULAR; INTRAVENOUS; SUBCUTANEOUS
Status: DISPENSED
Start: 2022-03-11

## (undated) RX ORDER — OXYCODONE HYDROCHLORIDE 5 MG/1
TABLET ORAL
Status: DISPENSED
Start: 2022-03-11

## (undated) RX ORDER — KETOROLAC TROMETHAMINE 30 MG/ML
INJECTION, SOLUTION INTRAMUSCULAR; INTRAVENOUS
Status: DISPENSED
Start: 2022-03-11

## (undated) RX ORDER — ONDANSETRON 2 MG/ML
INJECTION INTRAMUSCULAR; INTRAVENOUS
Status: DISPENSED
Start: 2022-03-11

## (undated) RX ORDER — LIDOCAINE HYDROCHLORIDE 20 MG/ML
INJECTION, SOLUTION EPIDURAL; INFILTRATION; INTRACAUDAL; PERINEURAL
Status: DISPENSED
Start: 2022-03-11